# Patient Record
Sex: FEMALE | Race: WHITE | Employment: STUDENT | URBAN - METROPOLITAN AREA
[De-identification: names, ages, dates, MRNs, and addresses within clinical notes are randomized per-mention and may not be internally consistent; named-entity substitution may affect disease eponyms.]

---

## 2020-11-10 ENCOUNTER — OFFICE VISIT (OUTPATIENT)
Dept: FAMILY MEDICINE CLINIC | Facility: CLINIC | Age: 20
End: 2020-11-10
Payer: COMMERCIAL

## 2020-11-10 VITALS
SYSTOLIC BLOOD PRESSURE: 106 MMHG | RESPIRATION RATE: 18 BRPM | BODY MASS INDEX: 25.06 KG/M2 | TEMPERATURE: 98 F | HEIGHT: 72 IN | WEIGHT: 185 LBS | DIASTOLIC BLOOD PRESSURE: 62 MMHG | HEART RATE: 84 BPM | OXYGEN SATURATION: 100 %

## 2020-11-10 DIAGNOSIS — Z20.822 SUSPECTED COVID-19 VIRUS INFECTION: Primary | ICD-10-CM

## 2020-11-10 PROCEDURE — 99072 ADDL SUPL MATRL&STAF TM PHE: CPT | Performed by: NURSE PRACTITIONER

## 2020-11-10 PROCEDURE — 3078F DIAST BP <80 MM HG: CPT | Performed by: NURSE PRACTITIONER

## 2020-11-10 PROCEDURE — 3008F BODY MASS INDEX DOCD: CPT | Performed by: NURSE PRACTITIONER

## 2020-11-10 PROCEDURE — 99202 OFFICE O/P NEW SF 15 MIN: CPT | Performed by: NURSE PRACTITIONER

## 2020-11-10 PROCEDURE — 3074F SYST BP LT 130 MM HG: CPT | Performed by: NURSE PRACTITIONER

## 2020-11-10 NOTE — PROGRESS NOTES
CHIEF COMPLAINT:   Patient presents with:  Covid    HPI:   Sundar Saravia is a 23year old female presents to clinic with complaints of covid symptoms. Patient has had symptoms for 2 days.    Reports: coughing, nasal congestion, sob, loss of taste and Breathing is non labored. CARDIO: RRR without murmur  EXTREMITIES: no cyanosis, clubbing or edema  LYMPH: bilateral anterior cervical lymphadenopathy, no submandibular lymphadenopathy. No  posterior cervical or occipital lymphadenopathy.     No results fo like eating and drinking utensils, towels, and bedding. · Wear a cloth face mask around other people. During a public health emergency, medical face masks may be reserved for healthcare workers. You may need to make a cloth face mask of your own.  You can mask. Wear the mask so that it covers both your nose and mouth. · Stay away from other people in your home. · Have no contact with pets and animals. · Don’t share food or personal items with people in your household.  This includes items like eating and recovered from COVID-19 may contain antibodies to help fight COVID-19 in people who are currently seriously ill with the disease. Experts don't know the safety of COVID-19 convalescent plasma or how well it works. Research continues.  The FDA has approved i symptoms. If you are normally healthy, the CDC does not advise retesting for COVID-19 with nose-throat swabs. You can stop self-isolation when all 3 of these are true:   1. You have had no fever for at least 24 hours.  This means no fever without medicine people should not wear a face covering.  This includes:   · Children younger than 3years old  · Anyone with a health, developmental, or mental health condition that can be made worse by wearing a mask  · Anyone who is unconscious or unable to remove the fa acute respiratory illness to validate their illness or return to work, as healthcare provider offices and medical facilities may be extremely busy and not able to provide documentation in a timely way.   • Employers should maintain flexible policies that pe

## 2020-11-10 NOTE — PATIENT INSTRUCTIONS
Coronavirus Disease 2019 (COVID-19): Caring for Yourself or Others   If you or a household member have symptoms of COVID-19, follow the guidelines below for preventing spread of the virus, and managing symptoms.    If you think you have COVID-19 symptoms instructions the healthcare staff give you. If you have been diagnosed with COVID-19  · Stay home and start self-isolation. Don’t leave your home unless you need to get medical care. Don't go to work, school, or public areas.  Don't use public transporta while it fights the virus. This is known as supportive care. Take care of yourself at home by:   · Getting rest. This helps your body fight the illness. · Staying hydrated. Drinking liquids is the best way to prevent dehydration.  Try to drink 6 to 8 glas counters, fridge door handle, bathroom surfaces, and others. · Don’t let anyone share household items with the sick person. This includes eating and drinking tools, towels, sheets, or blankets. · Clean fabrics and laundry thoroughly.   · Keep other people isolation will be somewhat different. Some conditions and treatments can cause a weak immune system. These include cancer treatment, bone marrow or organ transplants, and conditions such as HIV or other immune system disorders.  You may be advised to stay h with COVID-19 and were recently discharged from the hospital:   · Follow the instructions above for self-care and isolation. · Follow the hospital healthcare team’s specific instructions. · Ask questions if anything is unclear to you.  Write down answers

## 2021-02-08 PROBLEM — Z00.00 ROUTINE GENERAL MEDICAL EXAMINATION AT A HEALTH CARE FACILITY: Status: ACTIVE | Noted: 2021-02-08

## 2022-12-07 ENCOUNTER — WALK IN (OUTPATIENT)
Dept: URGENT CARE | Age: 22
End: 2022-12-07

## 2022-12-07 DIAGNOSIS — Z11.1 PPD SCREENING TEST: Primary | ICD-10-CM

## 2022-12-07 PROCEDURE — 86580 TB INTRADERMAL TEST: CPT | Performed by: NURSE PRACTITIONER

## 2022-12-10 ENCOUNTER — WALK IN (OUTPATIENT)
Dept: URGENT CARE | Age: 22
End: 2022-12-10

## 2022-12-10 DIAGNOSIS — Z11.1 ENCOUNTER FOR PPD SKIN TEST READING: Primary | ICD-10-CM

## 2022-12-10 LAB
INDURATION: 0 MM (ref 0–?)
SKIN TEST RESULT: NEGATIVE

## 2022-12-10 PROCEDURE — X1094 NO CHARGE VISIT: HCPCS | Performed by: NURSE PRACTITIONER

## 2024-03-01 ENCOUNTER — OFFICE VISIT (OUTPATIENT)
Dept: OTHER | Facility: HOSPITAL | Age: 24
End: 2024-03-01
Attending: EMERGENCY MEDICINE

## 2024-10-28 ENCOUNTER — APPOINTMENT (OUTPATIENT)
Dept: OTHER | Facility: HOSPITAL | Age: 24
End: 2024-10-28
Attending: NURSE PRACTITIONER

## 2024-10-31 ENCOUNTER — APPOINTMENT (OUTPATIENT)
Dept: OTHER | Facility: HOSPITAL | Age: 24
End: 2024-10-31
Attending: PREVENTIVE MEDICINE